# Patient Record
Sex: MALE | Race: BLACK OR AFRICAN AMERICAN | NOT HISPANIC OR LATINO | Employment: STUDENT | URBAN - METROPOLITAN AREA
[De-identification: names, ages, dates, MRNs, and addresses within clinical notes are randomized per-mention and may not be internally consistent; named-entity substitution may affect disease eponyms.]

---

## 2019-06-06 ENCOUNTER — HOSPITAL ENCOUNTER (EMERGENCY)
Facility: HOSPITAL | Age: 19
Discharge: HOME | End: 2019-06-06
Attending: SURGERY
Payer: MEDICAID

## 2019-06-06 VITALS
BODY MASS INDEX: 32.2 KG/M2 | OXYGEN SATURATION: 96 % | TEMPERATURE: 99 F | WEIGHT: 230 LBS | RESPIRATION RATE: 18 BRPM | HEART RATE: 86 BPM | SYSTOLIC BLOOD PRESSURE: 116 MMHG | HEIGHT: 71 IN | DIASTOLIC BLOOD PRESSURE: 72 MMHG

## 2019-06-06 DIAGNOSIS — K29.00 ACUTE SUPERFICIAL GASTRITIS, PRESENCE OF BLEEDING UNSPECIFIED: Primary | ICD-10-CM

## 2019-06-06 PROCEDURE — 99283 EMERGENCY DEPT VISIT LOW MDM: CPT | Mod: ER

## 2019-06-06 PROCEDURE — 25000003 PHARM REV CODE 250: Mod: ER | Performed by: SURGERY

## 2019-06-06 RX ORDER — DICYCLOMINE HYDROCHLORIDE 20 MG/1
20 TABLET ORAL 2 TIMES DAILY
Qty: 20 TABLET | Refills: 0 | Status: SHIPPED | OUTPATIENT
Start: 2019-06-06 | End: 2019-07-06

## 2019-06-06 RX ORDER — OXYCODONE AND ACETAMINOPHEN 5; 325 MG/1; MG/1
1 TABLET ORAL
Status: COMPLETED | OUTPATIENT
Start: 2019-06-06 | End: 2019-06-06

## 2019-06-06 RX ORDER — ONDANSETRON 4 MG/1
8 TABLET, ORALLY DISINTEGRATING ORAL
Status: COMPLETED | OUTPATIENT
Start: 2019-06-06 | End: 2019-06-06

## 2019-06-06 RX ADMIN — ONDANSETRON 8 MG: 4 TABLET, ORALLY DISINTEGRATING ORAL at 02:06

## 2019-06-06 RX ADMIN — OXYCODONE HYDROCHLORIDE AND ACETAMINOPHEN 1 TABLET: 5; 325 TABLET ORAL at 02:06

## 2019-06-06 NOTE — ED PROVIDER NOTES
"Encounter Date: 6/6/2019       History     Chief Complaint   Patient presents with    Abdominal Pain     Pt c/o sharp pain that "comes and then will leave", states pain last approximately 5 minutes before it eases.  States "went to throw up but nothing would come up."  Pt states had two episodes of loose stool.      Intermittent epigastric pain the last day.  The pain is associated with nausea but no vomiting no diarrhea.  The pain lasts a few minutes and goes away he is pain-free now    The history is provided by the patient.   Abdominal Pain   The onset of the illness was gradual. The abdominal pain is located in the epigastric region. The abdominal pain does not radiate. The severity of the abdominal pain is 6/10. The abdominal pain is relieved by nothing. The other symptoms of the illness do not include fever, fatigue, jaundice, melena, nausea, vomiting, diarrhea, dysuria, hematemesis or hematochezia.     Review of patient's allergies indicates:  No Known Allergies  Past Medical History:   Diagnosis Date    Skull fracture 2008     History reviewed. No pertinent surgical history.  Family History   Problem Relation Age of Onset    Migraines Mother      Social History     Tobacco Use    Smoking status: Never Smoker   Substance Use Topics    Alcohol use: No    Drug use: No     Review of Systems   Constitutional: Negative.  Negative for fatigue and fever.   HENT: Negative.    Eyes: Negative.    Respiratory: Negative.    Cardiovascular: Negative.    Gastrointestinal: Positive for abdominal pain. Negative for diarrhea, hematemesis, hematochezia, jaundice, melena, nausea and vomiting.   Endocrine: Negative.    Genitourinary: Negative.  Negative for dysuria.   Musculoskeletal: Negative.    Skin: Negative.    Allergic/Immunologic: Negative.    Neurological: Negative.    Hematological: Negative.    Psychiatric/Behavioral: Negative.        Physical Exam     Initial Vitals [06/06/19 1325]   BP Pulse Resp Temp SpO2 "   127/71 67 18 98.1 °F (36.7 °C) 98 %      MAP       --         Physical Exam    Nursing note and vitals reviewed.  Constitutional: He appears well-developed and well-nourished.   HENT:   Head: Normocephalic.   Mouth/Throat: Oropharynx is clear and moist.   Cardiovascular: Normal rate.   Pulmonary/Chest: Effort normal.   Abdominal: Soft. Normal appearance and bowel sounds are normal. He exhibits no distension. There is no hepatomegaly. There is no tenderness. There is no rigidity, no rebound, no guarding, no CVA tenderness, no tenderness at McBurney's point and negative Young's sign. No hernia.   Neurological: He is alert.   Skin: Skin is warm and dry.   Psychiatric: He has a normal mood and affect.         ED Course   Procedures  Labs Reviewed - No data to display       Imaging Results    None          Medical Decision Making:   Initial Assessment:   Epigastric pains symptoms consistent with gastritis  ED Management:  Physical exam is normal.  Patient has a normal exam  Normal vital signs. He is pain-free at the time of exam                      Clinical Impression:       ICD-10-CM ICD-9-CM   1. Acute superficial gastritis, presence of bleeding unspecified K29.00 535.40         Disposition:   Disposition: Discharged  Condition: Stable                        MICAELA Ansari III, MD  06/06/19 0098

## 2020-04-04 ENCOUNTER — HOSPITAL ENCOUNTER (EMERGENCY)
Facility: HOSPITAL | Age: 20
Discharge: HOME | End: 2020-04-04
Attending: EMERGENCY MEDICINE
Payer: MEDICAID

## 2020-04-04 VITALS
HEIGHT: 73 IN | HEART RATE: 83 BPM | OXYGEN SATURATION: 98 % | TEMPERATURE: 99 F | WEIGHT: 240 LBS | RESPIRATION RATE: 19 BRPM | SYSTOLIC BLOOD PRESSURE: 140 MMHG | BODY MASS INDEX: 31.81 KG/M2 | DIASTOLIC BLOOD PRESSURE: 79 MMHG

## 2020-04-04 DIAGNOSIS — J02.0 STREP THROAT: ICD-10-CM

## 2020-04-04 DIAGNOSIS — U07.1 COVID-19 VIRUS INFECTION: Primary | ICD-10-CM

## 2020-04-04 LAB
GROUP A STREP, MOLECULAR: POSITIVE
SARS-COV-2 RNA AMPLIFICATION, QUAL: POSITIVE

## 2020-04-04 PROCEDURE — 99284 EMERGENCY DEPT VISIT MOD MDM: CPT | Mod: ER

## 2020-04-04 PROCEDURE — U0002 COVID-19 LAB TEST NON-CDC: HCPCS | Mod: ER

## 2020-04-04 PROCEDURE — 87651 STREP A DNA AMP PROBE: CPT | Mod: ER

## 2020-04-04 RX ORDER — AZITHROMYCIN 250 MG/1
250 TABLET, FILM COATED ORAL DAILY
Qty: 6 TABLET | Refills: 0 | Status: SHIPPED | OUTPATIENT
Start: 2020-04-04 | End: 2023-01-07

## 2020-04-04 RX ORDER — ACETAMINOPHEN AND CODEINE PHOSPHATE 300; 30 MG/1; MG/1
1 TABLET ORAL EVERY 6 HOURS PRN
Qty: 7 TABLET | Refills: 0 | Status: SHIPPED | OUTPATIENT
Start: 2020-04-04 | End: 2020-04-14

## 2020-04-04 NOTE — ED PROVIDER NOTES
"Chief Complaint  Chief Complaint   Patient presents with    Generalized Body Aches     "I got body aches and  I have a headache for 3 days now" denies fever       HPI  Paras Vargas is a 19 y.o. male who presents with sore throat and headache and body aches.  Patient reports that he has been sick for about 3 days.  He has not really recognized any fever.  No cough.  No vomiting or diarrhea.  Patient reports his headache is mild-to-moderate in intensity and started gradually and has body aches are moderate intensity.  No exacerbating or relieving factors.  No trauma or neck pain or confusion    Past medical history  Past Medical History:   Diagnosis Date    Skull fracture 2008       Current Medications  No current facility-administered medications for this encounter.     Current Outpatient Medications:     acetaminophen-codeine 300-30mg (TYLENOL #3) 300-30 mg Tab, Take 1 tablet by mouth every 6 (six) hours as needed., Disp: 7 tablet, Rfl: 0    azithromycin (Z-ANISHA) 250 MG tablet, Take 1 tablet (250 mg total) by mouth once daily. Take first 2 tablets together, then 1 every day until finished., Disp: 6 tablet, Rfl: 0    Allergies  Review of patient's allergies indicates:  No Known Allergies    Surgical history  No past surgical history on file.    Social history  Social History     Socioeconomic History    Marital status: Single     Spouse name: Not on file    Number of children: Not on file    Years of education: Not on file    Highest education level: Not on file   Occupational History    Not on file   Social Needs    Financial resource strain: Not on file    Food insecurity:     Worry: Not on file     Inability: Not on file    Transportation needs:     Medical: Not on file     Non-medical: Not on file   Tobacco Use    Smoking status: Never Smoker   Substance and Sexual Activity    Alcohol use: No    Drug use: No    Sexual activity: Not on file   Lifestyle    Physical activity:     Days per week: " "Not on file     Minutes per session: Not on file    Stress: Not on file   Relationships    Social connections:     Talks on phone: Not on file     Gets together: Not on file     Attends Hoahaoism service: Not on file     Active member of club or organization: Not on file     Attends meetings of clubs or organizations: Not on file     Relationship status: Not on file   Other Topics Concern    Not on file   Social History Narrative    Not on file       Family History  Family History   Problem Relation Age of Onset    Migraines Mother        Review of systems  : No dysuria or discharge.  Musculoskeletal: No injury; full range of motion.  Skin: No rash, abscess, or laceration.  Neurologic: No new focal weakness or sensory changes.  All systems otherwise negative except as noted in ROS and HPI    Physical Exam  Vital signs: BP (!) 140/79 (BP Location: Left arm, Patient Position: Sitting)   Pulse 83   Temp 99.2 °F (37.3 °C) (Oral)   Resp 19   Ht 6' 1" (1.854 m)   Wt 108.9 kg (240 lb)   SpO2 98%   BMI 31.66 kg/m²   Constitutional: No acute distress.  Well developed, alert, oriented and appropriate.  HENT: Normocephalic, atraumatic. Normal ear, nose.  Tonsillar hypertrophy and erythema without exudate.  No asymmetry or airway compromise  Eyes: PERRL, EOMI, normal conjunctiva.  Neck: Normal range of motion, no tenderness; supple.  Respiratory: Nonlabored breathing with normal breath sounds.  Cardiovascular: RRR with no pulse deficit.  GI: Soft, nontender, no rebound or guarding.  Musculoskeletal: Normal ROM, no tenderness, injury, or edema.  Skin: Warm, dry skin without infection or injury.  Neurologic: Normal motor, sensation with no new focal deficit.  Psychiatric: Affect normal, judgement normal, mood normal.  No SI, HI, and not gravely disabled.    Labs  Pertinent labs reviewed (see chart for details)  Labs Reviewed   GROUP A STREP, MOLECULAR - Abnormal; Notable for the following components:       Result " Value    Group A Strep, Molecular Positive (*)     All other components within normal limits   SARS-COV-2 RNA AMPLIFICATION, QUAL - Abnormal; Notable for the following components:    SARS-CoV-2 RNA, Amplification, Qual Positive (*)     All other components within normal limits       ECG  No results found for this or any previous visit.  ECG interpreted by ED MD    Radiology  No orders to display       Procedures  Procedures    Medications - No data to display    ED course and medical decision making         Patient comfortable and happy with plan to go home at this time and understands reasons to return emergency department    Disposition    Patient discharged in stable condition      Final impression  1. COVID-19 virus infection    2. Strep throat        Critical care time spent with this patient was 0 minutes excluding the procedure time.          Michael Sánchez MD  04/04/20 1000

## 2021-04-01 ENCOUNTER — HOSPITAL ENCOUNTER (EMERGENCY)
Facility: HOSPITAL | Age: 21
Discharge: HOME | End: 2021-04-01
Attending: EMERGENCY MEDICINE
Payer: MEDICAID

## 2021-04-01 VITALS
HEIGHT: 70 IN | RESPIRATION RATE: 13 BRPM | HEART RATE: 75 BPM | TEMPERATURE: 99 F | OXYGEN SATURATION: 100 % | BODY MASS INDEX: 35.79 KG/M2 | WEIGHT: 250 LBS | SYSTOLIC BLOOD PRESSURE: 147 MMHG | DIASTOLIC BLOOD PRESSURE: 77 MMHG

## 2021-04-01 DIAGNOSIS — M79.673 FOOT PAIN: ICD-10-CM

## 2021-04-01 DIAGNOSIS — S90.31XA CONTUSION OF RIGHT FOOT, INITIAL ENCOUNTER: Primary | ICD-10-CM

## 2021-04-01 PROCEDURE — 25000003 PHARM REV CODE 250: Mod: ER | Performed by: PHYSICIAN ASSISTANT

## 2021-04-01 PROCEDURE — 99283 EMERGENCY DEPT VISIT LOW MDM: CPT | Mod: 25,ER

## 2021-04-01 RX ORDER — NAPROXEN 500 MG/1
500 TABLET ORAL 2 TIMES DAILY WITH MEALS
Qty: 12 TABLET | Refills: 0 | Status: SHIPPED | OUTPATIENT
Start: 2021-04-01

## 2021-04-01 RX ORDER — KETOROLAC TROMETHAMINE 10 MG/1
10 TABLET, FILM COATED ORAL
Status: COMPLETED | OUTPATIENT
Start: 2021-04-01 | End: 2021-04-01

## 2021-04-01 RX ADMIN — KETOROLAC TROMETHAMINE 10 MG: 10 TABLET, FILM COATED ORAL at 01:04

## 2021-12-30 ENCOUNTER — HOSPITAL ENCOUNTER (EMERGENCY)
Facility: HOSPITAL | Age: 21
Discharge: HOME | End: 2021-12-30
Payer: MEDICAID

## 2021-12-30 VITALS
DIASTOLIC BLOOD PRESSURE: 91 MMHG | TEMPERATURE: 98 F | SYSTOLIC BLOOD PRESSURE: 140 MMHG | HEIGHT: 70 IN | HEART RATE: 64 BPM | WEIGHT: 265 LBS | OXYGEN SATURATION: 98 % | RESPIRATION RATE: 20 BRPM | BODY MASS INDEX: 37.94 KG/M2

## 2021-12-30 DIAGNOSIS — H05.011 CELLULITIS OF RIGHT ORBITAL REGION: Primary | ICD-10-CM

## 2021-12-30 PROCEDURE — 99283 EMERGENCY DEPT VISIT LOW MDM: CPT | Mod: ER

## 2021-12-30 RX ORDER — SULFAMETHOXAZOLE AND TRIMETHOPRIM 800; 160 MG/1; MG/1
1 TABLET ORAL 2 TIMES DAILY
Qty: 14 TABLET | Refills: 0 | Status: SHIPPED | OUTPATIENT
Start: 2021-12-30 | End: 2022-01-06

## 2021-12-30 NOTE — DISCHARGE INSTRUCTIONS
Take prescribed antibiotic daily as directed until completely finished.  Follow-up with PCP for continued care and management.

## 2021-12-30 NOTE — ED PROVIDER NOTES
Encounter Date: 12/30/2021       History     Chief Complaint   Patient presents with    Eye Problem     Right upper lid swelling and itching that started yesterday     21-year-old male presenting to ED with complaints of right upper eyelid redness, swelling itching since yesterday morning.  Patient reports slight worsening with no improvement.  Patient denies any contact lenses or glasses use.  Patient denies any recent injury or trauma.  Patient denies any vision changes.  Patient denies any recent debris exposure.  Patient denies any pain.  No alleviating factors noted.  No OTC meds taken.  No other complaints at this time.    The history is provided by the patient. No  was used.     Review of patient's allergies indicates:  No Known Allergies  Past Medical History:   Diagnosis Date    Meningitis     Skull fracture 2008     History reviewed. No pertinent surgical history.  Family History   Problem Relation Age of Onset    Migraines Mother      Social History     Tobacco Use    Smoking status: Never Smoker    Smokeless tobacco: Never Used   Substance Use Topics    Alcohol use: Yes     Comment: seldom    Drug use: Yes     Types: Marijuana     Review of Systems   Constitutional: Negative for chills, diaphoresis, fatigue and fever.   HENT: Negative for congestion, ear pain, sinus pain, sore throat and trouble swallowing.    Eyes: Positive for redness and itching. Negative for photophobia, pain, discharge and visual disturbance.   Respiratory: Negative for cough, choking, chest tightness, shortness of breath, wheezing and stridor.    Cardiovascular: Negative for chest pain and palpitations.   Gastrointestinal: Negative for abdominal pain, diarrhea, nausea and vomiting.   Endocrine: Negative.    Genitourinary: Negative for dysuria, flank pain and hematuria.   Musculoskeletal: Negative for back pain, myalgias and neck pain.   Skin: Negative.    Allergic/Immunologic: Negative.    Neurological:  Negative for dizziness, tremors, seizures, weakness, light-headedness, numbness and headaches.   Hematological: Negative.    Psychiatric/Behavioral: Negative.    All other systems reviewed and are negative.      Physical Exam     Initial Vitals [12/30/21 0912]   BP Pulse Resp Temp SpO2   (!) 140/91 64 20 97.9 °F (36.6 °C) 98 %      MAP       --         Physical Exam    Nursing note and vitals reviewed.  Constitutional: Vital signs are normal. He appears well-developed and well-nourished. He is not diaphoretic. No distress.   21-year-old male sitting upright in no acute distress, nontoxic, alert and oriented, breathing comfortably on room air, steady gait   HENT:   Head: Normocephalic and atraumatic.   Right Ear: Hearing and external ear normal.   Left Ear: Hearing and external ear normal.   Nose: Nose normal. No mucosal edema or rhinorrhea.   Eyes: Conjunctivae and EOM are normal. Pupils are equal, round, and reactive to light. Lids are everted and swept, no foreign bodies found. Right eye exhibits exudate. Right eye exhibits no chemosis, no discharge and no hordeolum. No foreign body present in the right eye. Left eye exhibits no chemosis, no discharge, no exudate and no hordeolum. No foreign body present in the left eye. Right conjunctiva is not injected. Right conjunctiva has no hemorrhage. Left conjunctiva is not injected. Left conjunctiva has no hemorrhage. No scleral icterus.   Right upper eyelid mild edema swelling and erythema with matting of the eyelids, concerning for early soft tissue infection.  No appreciable foreign bodies.  No hemorrhage or injections of the conjunctiva.  Pupils 5 mm equal round reactive bilaterally, extraocular motions fully intact, vision unchanged.  20/25 visual acuity bilaterally.   Neck: Trachea normal. Neck supple.   Normal range of motion.  Cardiovascular: Normal rate, regular rhythm and normal pulses.   Pulmonary/Chest: Effort normal. No accessory muscle usage. No tachypnea.  No respiratory distress.   Abdominal: There is no rigidity and no CVA tenderness.   Musculoskeletal:         General: No tenderness. Normal range of motion.      Cervical back: Normal range of motion and neck supple.     Neurological: He is alert and oriented to person, place, and time. He has normal strength. He displays no tremor. He exhibits normal muscle tone. He displays no seizure activity. Coordination normal. GCS score is 15. GCS eye subscore is 4. GCS verbal subscore is 5. GCS motor subscore is 6.   Skin: Skin is warm and dry. Capillary refill takes less than 2 seconds.         ED Course   Procedures  Labs Reviewed - No data to display       Imaging Results    None          Medications - No data to display                       Clinical Impression:   Final diagnoses:  [H05.011] Cellulitis of right orbital region (Primary)          ED Disposition Condition    Discharge Stable        ED Prescriptions     Medication Sig Dispense Start Date End Date Auth. Provider    sulfamethoxazole-trimethoprim 800-160mg (BACTRIM DS) 800-160 mg Tab Take 1 tablet by mouth 2 (two) times daily. for 7 days 14 tablet 12/30/2021 1/6/2022 Clarice Farias PA-C        Follow-up Information     Follow up With Specialties Details Why Contact [facility] - Emergency Dept Emergency Medicine Go in 2 days If symptoms worsen, For wound re-check [address] [facility] [id number] [phone]    PRIMARY CARE MD  Schedule an appointment as soon as possible for a visit in 3 days If symptoms worsen         PATIENT SEEN BY LO ONLY.    Discussed care plan with patient.  Discussed possible early right orbital cellulitis.  Patient is still able to fully open and close eyelids, vision unchanged, no pain, no appreciable foreign bodies, no injury or trauma.  Patient will be covered with antibiotic, educated on follow-up in the emergency department within the next 48 hours if no improvement or returning sooner if any  worsening.  He expresses full understanding.  Patient is stable, all questions answered and ready for discharge.     Clarice Farias PA-C  12/30/21 0985

## 2023-01-06 ENCOUNTER — HOSPITAL ENCOUNTER (EMERGENCY)
Facility: HOSPITAL | Age: 23
Discharge: HOME | End: 2023-01-06
Attending: EMERGENCY MEDICINE
Payer: MEDICAID

## 2023-01-06 VITALS
RESPIRATION RATE: 17 BRPM | TEMPERATURE: 98 F | WEIGHT: 270 LBS | HEIGHT: 70 IN | DIASTOLIC BLOOD PRESSURE: 69 MMHG | OXYGEN SATURATION: 100 % | SYSTOLIC BLOOD PRESSURE: 137 MMHG | BODY MASS INDEX: 38.65 KG/M2 | HEART RATE: 92 BPM

## 2023-01-06 DIAGNOSIS — R11.2 NAUSEA AND VOMITING, UNSPECIFIED VOMITING TYPE: Primary | ICD-10-CM

## 2023-01-06 DIAGNOSIS — R19.7 DIARRHEA, UNSPECIFIED TYPE: ICD-10-CM

## 2023-01-06 DIAGNOSIS — R93.89 ABNORMAL CT SCAN: ICD-10-CM

## 2023-01-06 LAB
ALBUMIN SERPL BCP-MCNC: 5.5 G/DL (ref 3.5–5.2)
ALP SERPL-CCNC: 113 U/L (ref 38–126)
ALT SERPL W/O P-5'-P-CCNC: 51 U/L (ref 10–44)
ANION GAP SERPL CALC-SCNC: 12 MMOL/L (ref 8–16)
AST SERPL-CCNC: 38 U/L (ref 15–46)
BASOPHILS # BLD AUTO: 0.06 K/UL (ref 0–0.2)
BASOPHILS NFR BLD: 0.4 % (ref 0–1.9)
BILIRUB SERPL-MCNC: 0.8 MG/DL (ref 0.1–1)
CALCIUM SERPL-MCNC: 9.6 MG/DL (ref 8.7–10.5)
CHLORIDE SERPL-SCNC: 108 MMOL/L (ref 95–110)
CO2 SERPL-SCNC: 20 MMOL/L (ref 23–29)
CREAT SERPL-MCNC: 0.77 MG/DL (ref 0.5–1.4)
DIFFERENTIAL METHOD: ABNORMAL
EOSINOPHIL # BLD AUTO: 0.2 K/UL (ref 0–0.5)
EOSINOPHIL NFR BLD: 1.1 % (ref 0–8)
ERYTHROCYTE [DISTWIDTH] IN BLOOD BY AUTOMATED COUNT: 12.6 % (ref 11.5–14.5)
EST. GFR  (NO RACE VARIABLE): >60 ML/MIN/1.73 M^2
GLUCOSE SERPL-MCNC: 114 MG/DL (ref 70–110)
HCT VFR BLD AUTO: 49.8 % (ref 40–54)
HGB BLD-MCNC: 16.7 G/DL (ref 14–18)
IMM GRANULOCYTES # BLD AUTO: 0.04 K/UL (ref 0–0.04)
IMM GRANULOCYTES NFR BLD AUTO: 0.3 % (ref 0–0.5)
INFLUENZA A, MOLECULAR: NEGATIVE
INFLUENZA B, MOLECULAR: NEGATIVE
LIPASE SERPL-CCNC: 66 U/L (ref 23–300)
LYMPHOCYTES # BLD AUTO: 1.4 K/UL (ref 1–4.8)
LYMPHOCYTES NFR BLD: 10.3 % (ref 18–48)
MCH RBC QN AUTO: 27.4 PG (ref 27–31)
MCHC RBC AUTO-ENTMCNC: 33.5 G/DL (ref 32–36)
MCV RBC AUTO: 82 FL (ref 82–98)
MONOCYTES # BLD AUTO: 1.1 K/UL (ref 0.3–1)
MONOCYTES NFR BLD: 8 % (ref 4–15)
NEUTROPHILS # BLD AUTO: 11.1 K/UL (ref 1.8–7.7)
NEUTROPHILS NFR BLD: 79.9 % (ref 38–73)
NRBC BLD-RTO: 0 /100 WBC
PLATELET # BLD AUTO: 321 K/UL (ref 150–450)
PMV BLD AUTO: 9.8 FL (ref 9.2–12.9)
POTASSIUM SERPL-SCNC: 3.9 MMOL/L (ref 3.5–5.1)
PROT SERPL-MCNC: 9.1 G/DL (ref 6–8.4)
RBC # BLD AUTO: 6.1 M/UL (ref 4.6–6.2)
SARS-COV-2 RDRP RESP QL NAA+PROBE: NEGATIVE
SODIUM SERPL-SCNC: 140 MMOL/L (ref 136–145)
SPECIMEN SOURCE: NORMAL
UUN UR-MCNC: 18 MG/DL (ref 2–20)
WBC # BLD AUTO: 13.85 K/UL (ref 3.9–12.7)

## 2023-01-06 PROCEDURE — 25000003 PHARM REV CODE 250: Mod: ER | Performed by: EMERGENCY MEDICINE

## 2023-01-06 PROCEDURE — 96372 THER/PROPH/DIAG INJ SC/IM: CPT | Performed by: EMERGENCY MEDICINE

## 2023-01-06 PROCEDURE — 99285 EMERGENCY DEPT VISIT HI MDM: CPT | Mod: 25,ER

## 2023-01-06 PROCEDURE — 85025 COMPLETE CBC W/AUTO DIFF WBC: CPT | Mod: ER | Performed by: EMERGENCY MEDICINE

## 2023-01-06 PROCEDURE — 25500020 PHARM REV CODE 255: Mod: ER | Performed by: EMERGENCY MEDICINE

## 2023-01-06 PROCEDURE — 63600175 PHARM REV CODE 636 W HCPCS: Mod: ER | Performed by: EMERGENCY MEDICINE

## 2023-01-06 PROCEDURE — 96374 THER/PROPH/DIAG INJ IV PUSH: CPT | Mod: 59,ER

## 2023-01-06 PROCEDURE — U0002 COVID-19 LAB TEST NON-CDC: HCPCS | Mod: ER | Performed by: EMERGENCY MEDICINE

## 2023-01-06 PROCEDURE — 87502 INFLUENZA DNA AMP PROBE: CPT | Mod: ER | Performed by: EMERGENCY MEDICINE

## 2023-01-06 PROCEDURE — 96361 HYDRATE IV INFUSION ADD-ON: CPT | Mod: ER

## 2023-01-06 PROCEDURE — 83690 ASSAY OF LIPASE: CPT | Mod: ER | Performed by: EMERGENCY MEDICINE

## 2023-01-06 PROCEDURE — 80053 COMPREHEN METABOLIC PANEL: CPT | Mod: ER | Performed by: EMERGENCY MEDICINE

## 2023-01-06 RX ORDER — ENOXAPARIN SODIUM 100 MG/ML
1 INJECTION SUBCUTANEOUS
Status: COMPLETED | OUTPATIENT
Start: 2023-01-06 | End: 2023-01-06

## 2023-01-06 RX ORDER — SODIUM CHLORIDE 9 MG/ML
INJECTION, SOLUTION INTRAVENOUS
Status: COMPLETED | OUTPATIENT
Start: 2023-01-06 | End: 2023-01-06

## 2023-01-06 RX ORDER — ONDANSETRON 2 MG/ML
4 INJECTION INTRAMUSCULAR; INTRAVENOUS
Status: COMPLETED | OUTPATIENT
Start: 2023-01-06 | End: 2023-01-06

## 2023-01-06 RX ORDER — DICYCLOMINE HYDROCHLORIDE 10 MG/ML
20 INJECTION INTRAMUSCULAR
Status: COMPLETED | OUTPATIENT
Start: 2023-01-06 | End: 2023-01-06

## 2023-01-06 RX ORDER — ONDANSETRON 4 MG/1
4 TABLET, ORALLY DISINTEGRATING ORAL EVERY 6 HOURS PRN
Qty: 20 TABLET | Refills: 0 | Status: SHIPPED | OUTPATIENT
Start: 2023-01-06

## 2023-01-06 RX ADMIN — SODIUM CHLORIDE: 0.9 INJECTION, SOLUTION INTRAVENOUS at 01:01

## 2023-01-06 RX ADMIN — IOHEXOL 100 ML: 350 INJECTION, SOLUTION INTRAVENOUS at 02:01

## 2023-01-06 RX ADMIN — ONDANSETRON 4 MG: 2 INJECTION INTRAMUSCULAR; INTRAVENOUS at 01:01

## 2023-01-06 RX ADMIN — ENOXAPARIN SODIUM 120 MG: 60 INJECTION SUBCUTANEOUS at 03:01

## 2023-01-06 RX ADMIN — DICYCLOMINE HYDROCHLORIDE 20 MG: 20 INJECTION, SOLUTION INTRAMUSCULAR at 01:01

## 2023-01-06 NOTE — ED PROVIDER NOTES
"Encounter Date: 1/6/2023       History     Chief Complaint   Patient presents with    Abdominal Pain     ABD pain and NVD that started today     22-year-old male presenting with 1 day of diffuse abdominal pain with vomiting and diarrhea.  No fever, cough, congestion, sore throat, dysuria.  Patient says he had diarrhea last night but today he's had "straight water coming out of his ass".  No blood in vomit or stool.  No treatments attempted prior to arrival.    Review of patient's allergies indicates:  No Known Allergies  Past Medical History:   Diagnosis Date    Meningitis     Skull fracture 2008     History reviewed. No pertinent surgical history.  Family History   Problem Relation Age of Onset    Migraines Mother      Social History     Tobacco Use    Smoking status: Never    Smokeless tobacco: Never   Substance Use Topics    Alcohol use: Yes     Comment: seldom    Drug use: Yes     Types: Marijuana     Review of Systems   Constitutional:  Negative for appetite change.   Eyes:  Negative for pain.   Respiratory:  Negative for shortness of breath.    Cardiovascular:  Negative for chest pain.   Gastrointestinal:  Positive for abdominal pain, diarrhea, nausea and vomiting.   Genitourinary:  Negative for frequency.   Musculoskeletal:  Negative for arthralgias and neck pain.   Neurological:  Negative for headaches.   Psychiatric/Behavioral:  Negative for confusion.      Physical Exam     Initial Vitals [01/06/23 1254]   BP Pulse Resp Temp SpO2   (!) 160/95 87 20 98.9 °F (37.2 °C) 97 %      MAP       --         Physical Exam    Nursing note and vitals reviewed.  HENT:   Head: Atraumatic.   Eyes: Conjunctivae and EOM are normal.   Neck:   Normal range of motion.  Cardiovascular:      Exam reveals no gallop and no friction rub.       No murmur heard.  Pulmonary/Chest: Breath sounds normal. No respiratory distress. He has no wheezes. He has no rales.   Abdominal: Abdomen is soft. Bowel sounds are normal. He exhibits no " distension. There is abdominal tenderness (Periumbilical). There is no rebound.   Musculoskeletal:         General: No edema. Normal range of motion.      Cervical back: Normal range of motion.     Neurological: He is alert and oriented to person, place, and time.   Skin: Skin is warm and dry.   Psychiatric: He has a normal mood and affect.       ED Course   Procedures  Labs Reviewed   COMPREHENSIVE METABOLIC PANEL - Abnormal; Notable for the following components:       Result Value    CO2 20 (*)     Glucose 114 (*)     Total Protein 9.1 (*)     Albumin 5.5 (*)     ALT 51 (*)     All other components within normal limits   CBC W/ AUTO DIFFERENTIAL - Abnormal; Notable for the following components:    WBC 13.85 (*)     Gran # (ANC) 11.1 (*)     Mono # 1.1 (*)     Gran % 79.9 (*)     Lymph % 10.3 (*)     All other components within normal limits   INFLUENZA A & B BY MOLECULAR   LIPASE   SARS-COV-2 RNA AMPLIFICATION, QUAL    Narrative:     Is the patient symptomatic?->Yes          Imaging Results              CT Abdomen Pelvis With Contrast (Final result)  Result time 01/06/23 14:34:50      Final result by DEBBIE Duran Sr., MD (01/06/23 14:34:50)                   Impression:      1. There are findings worrisome for a pulmonary embolus to the lingula.  2. Lymphadenopathy.  There are mildly enlarged lymph nodes in the groins.  One of the larger ones is located in the left groin. It has a short axis measurement of 13 mm.  3. The above findings and impressions were discussed with Dr. Madrid via the telephone at 14:33 on 01/06/2023.  I observed these findings at 14:30 on 01/06/2023.  All CT scans at this facility use dose modulation, iterative reconstruction, and/or weight base dosing when appropriate to reduce radiation dose when appropriate to reduce radiation dose to as low as reasonably achievable.      Electronically signed by: Higinio Duran MD  Date:    01/06/2023  Time:    14:34               Narrative:     EXAMINATION:  CT ABDOMEN PELVIS WITH CONTRAST    CLINICAL HISTORY:  Abdominal pain, acute, nonlocalized;    TECHNIQUE:  Standard abdomen and pelvis CT protocol with IV contrast was performed    COMPARISON:  None    FINDINGS:  Finding: The size of the heart is within normal limits.  There are findings worrisome for a pulmonary embolus to the lingula.  There are minimal dependent atelectatic changes in both lungs.  There is no pneumothorax or pleural effusion.    The liver, gallbladder, pancreas, spleen, adrenals, and kidneys are normal in appearance. The ureters and the urinary bladder are normal in appearance.  The prostate is normal in appearance.  The appendix and the rest of the gastrointestinal system are normal in appearance. There is no free fluid within the abdomen or pelvis. There is no pneumoperitoneum.  There are mildly enlarged lymph nodes in the groins.  One of the larger ones is located in the left groin.  It has a short axis measurement of 13 mm.                                       Medications   enoxaparin injection 120 mg (has no administration in time range)   dicyclomine injection 20 mg (20 mg Intramuscular Given 1/6/23 1324)   ondansetron injection 4 mg (4 mg Intravenous Given 1/6/23 1322)   0.9%  NaCl infusion ( Intravenous New Bag 1/6/23 1323)   iohexoL (OMNIPAQUE 350) injection 100 mL (100 mLs Intravenous Given 1/6/23 1402)     Medical Decision Making:   Initial Assessment:   22-year-old male presenting with vomiting and diarrhea with diffuse abdominal pain since this morning.  Appears mildly uncomfortable.  Vital signs notable for mild hypertension.  Has some periumbilical tenderness without evidence of peritonitis.  IV fluids, Zofran and Bentyl given.    2:53 PM  Labs show mild leukocytosis.  No significant metabolic abnormalities.  No evidence of pancreatitis. Dr. Duran of Radiology called regarding a CT of the abdomen and pelvis.  No significant intra-abdominal findings however he noted  what appears to be a pulmonary embolism in the left lingula.  The patient has no chest pain or shortness of breath.  Discussed case with Dr. Reeves of Hematology on-call.  Will give the patient a dose of Lovenox 1 mg/kg now.  Discussed findings with the patient.  He will return tomorrow morning to obtain a CTA of the chest to further evaluate for PE.  Explained to patient the importance of returning tomorrow as the alternative would be to initiate anticoagulation long-term.  He says he will definitely come tomorrow morning for CTA.                        Clinical Impression:   Final diagnoses:  [R11.2] Nausea and vomiting, unspecified vomiting type (Primary)  [R19.7] Diarrhea, unspecified type  [R93.89] Abnormal CT scan        ED Disposition Condition    Discharge Stable          ED Prescriptions       Medication Sig Dispense Start Date End Date Auth. Provider    ondansetron (ZOFRAN-ODT) 4 MG TbDL Take 1 tablet (4 mg total) by mouth every 6 (six) hours as needed. 20 tablet 1/6/2023 -- Jose Madrid MD          Follow-up Information       Follow up With Specialties Details Why Contact [facility] - Emergency Dept Emergency Medicine Go to  Tomorrow for CTA of chest to rule out blood clot [address] [facility] [id number] [phone]             Jose Madrid MD  01/06/23 3409

## 2023-01-06 NOTE — ED NOTES
Patient identifiers verified. Presents to ED with complaints of abdominal pain, nausea and vomiting that began this morning. Denies any other complaints including , fever, chills. ER workup in progress.     APPEARANCE: Pt clean and well groomed with clothes appropriately fastened. No distress is noted.  NEURO: Pt is awake and alert x3, Pt follows commands and affect is appropriate. Pt is moving all extremities well and sensation is intact. Speech is clear.  RESPIRATORY: Respirations are even and unlabored on room air. No accessory muscle use noted. Normal rate and effort is noted. Pt placed on continuous pulse ox with O2 sats noted at 98%.   CARDIAC:  Rate is normal. No abnormal heart sounds noted. Radial and dorsalis pedis pulses are palpable and +2. No edema noted. Cap refill is <3.   GASTRO: Pt denies abdominal pain/tenderness. States bowel movements have been regular. Bowel sounds are present and normal.   : Pt denies any pain or frequency with urination.  SKIN: Skin is warm, dry and intact. Skin color is appropriate for ethnicity. Normal skin turgor noted. Mucous membranes are moist.   MUSCULOSKELETAL: No abnormalities are noted.

## 2023-01-06 NOTE — DISCHARGE INSTRUCTIONS
The CT scan of your abdomen today showed a possible blood clot of 1 of the arteries of your lung.  This was not the ideal study to diagnosed this.  Please return tomorrow to get a CTA of your chest to further evaluate for pulmonary embolism   No

## 2023-01-07 ENCOUNTER — HOSPITAL ENCOUNTER (EMERGENCY)
Facility: HOSPITAL | Age: 23
Discharge: HOME | End: 2023-01-07
Attending: EMERGENCY MEDICINE
Payer: MEDICAID

## 2023-01-07 VITALS
HEIGHT: 70 IN | WEIGHT: 270 LBS | RESPIRATION RATE: 17 BRPM | SYSTOLIC BLOOD PRESSURE: 139 MMHG | DIASTOLIC BLOOD PRESSURE: 80 MMHG | HEART RATE: 84 BPM | OXYGEN SATURATION: 98 % | TEMPERATURE: 99 F | BODY MASS INDEX: 38.65 KG/M2

## 2023-01-07 DIAGNOSIS — R03.0 ELEVATED BP WITHOUT DIAGNOSIS OF HYPERTENSION: ICD-10-CM

## 2023-01-07 DIAGNOSIS — K76.0 FATTY LIVER: ICD-10-CM

## 2023-01-07 DIAGNOSIS — R19.7 DIARRHEA, UNSPECIFIED TYPE: Primary | ICD-10-CM

## 2023-01-07 LAB
ANION GAP SERPL CALC-SCNC: 8 MMOL/L (ref 8–16)
CALCIUM SERPL-MCNC: 8.6 MG/DL (ref 8.7–10.5)
CHLORIDE SERPL-SCNC: 104 MMOL/L (ref 95–110)
CO2 SERPL-SCNC: 24 MMOL/L (ref 23–29)
CREAT SERPL-MCNC: 0.85 MG/DL (ref 0.5–1.4)
EST. GFR  (NO RACE VARIABLE): >60 ML/MIN/1.73 M^2
GLUCOSE SERPL-MCNC: 110 MG/DL (ref 70–110)
POTASSIUM SERPL-SCNC: 3.3 MMOL/L (ref 3.5–5.1)
SODIUM SERPL-SCNC: 136 MMOL/L (ref 136–145)
UUN UR-MCNC: 17 MG/DL (ref 2–20)

## 2023-01-07 PROCEDURE — 80048 BASIC METABOLIC PNL TOTAL CA: CPT | Mod: ER | Performed by: EMERGENCY MEDICINE

## 2023-01-07 PROCEDURE — 25500020 PHARM REV CODE 255: Mod: ER | Performed by: EMERGENCY MEDICINE

## 2023-01-07 PROCEDURE — 99285 EMERGENCY DEPT VISIT HI MDM: CPT | Mod: 25,ER

## 2023-01-07 RX ADMIN — IOHEXOL 100 ML: 350 INJECTION, SOLUTION INTRAVENOUS at 09:01

## 2023-01-07 NOTE — Clinical Note
"Paras Eid" Alicia was seen and treated in our emergency department on 1/7/2023.  He may return to work on 01/09/2023.       If you have any questions or concerns, please don't hesitate to call.      STU Geller RN    "

## 2023-01-07 NOTE — ED NOTES
Patient presents to Ed for CTA; states he was seen yesterday had a ct with contrast done and there was a finding of a possible blood clot in his lung. He was instructed to return today for another scan of his chest with contrast. He denies SOB,CP or n/v. He does state that he is still having loose stool.

## 2023-01-07 NOTE — ED PROVIDER NOTES
Chief Complaint: told to come back for a CT scan     History of Present Illness:    Paras Vargas 22 y.o. with a  has a past medical history of Meningitis and Skull fracture (2008). who presents to the emergency department today with a complaint of being told last night that he needed to come back today and get a CT scan for a blood clot.  Pt was here yesterday for abdominal pain with NVD.  He had a CT scan of the abd/pelvis performed that showed a possible PE.  He had contrast already last night so he was told to return for imaging in order to confirm before started anticoagulants.  He is here today for this study.  He is still having some diarrhea, watery.  Denies any chest pain, shortness of breath, leg swelling     ROS    Constitutional: No fever, no chills.  Eyes: No discharge. No pain.  ENT: No nasal drainage. No ear ache. No sore throat.  Cardiovascular: No chest pain, no palpitations.  Respiratory: No cough, no shortness of breath.  Gastrointestinal: No abdominal pain.  Genitourinary: No hematuria, dysuria, urgency.  Musculoskeletal: No back pain.   Skin: No rashes, no lesions.  Neurological: No headache, no focal weakness.      Otherwise remaining ROS negative     The history is provided by the patient      Reviewed and verified by myself:   PMH/PSH/SOC/FH REVIEWED :  Paras Vargas  has a past medical history of Meningitis and Skull fracture (2008). and   has no past surgical history on file. with  reports that he has never smoked. He has never used smokeless tobacco. He reports current alcohol use. He reports current drug use. Drug: Marijuana. and a family history includes Migraines in his mother.      Nursing/Ancillary staff note reviewed.  ALLERGIES REVIEWED  MEDICATIONS REVIEWED  VS reviewed         Physical Exam     ED Triage Vitals [01/07/23 0749]   /82   Pulse 92   Resp 19   Temp 99.1 °F (37.3 °C)   SpO2 96 %       Physical Exam  Vitals and nursing note reviewed.   Constitutional:        General: He is not in acute distress.     Appearance: He is well-developed.   HENT:      Head: Normocephalic and atraumatic.      Right Ear: External ear normal.      Left Ear: External ear normal.      Nose: Nose normal.      Mouth/Throat:      Mouth: Mucous membranes are moist.   Eyes:      General:         Right eye: No discharge.         Left eye: No discharge.      Conjunctiva/sclera: Conjunctivae normal.   Cardiovascular:      Rate and Rhythm: Normal rate.   Pulmonary:      Effort: Pulmonary effort is normal. No respiratory distress.   Abdominal:      General: There is no distension.   Musculoskeletal:         General: Normal range of motion.      Cervical back: Normal range of motion.   Skin:     General: Skin is warm and dry.      Coloration: Skin is not jaundiced.   Neurological:      General: No focal deficit present.      Mental Status: He is alert and oriented to person, place, and time.      Cranial Nerves: No cranial nerve deficit.      Motor: No abnormal muscle tone.   Psychiatric:         Behavior: Behavior normal.             I independently interpreted the lab results and it showed the following:      Labs Reviewed   BASIC METABOLIC PANEL - Abnormal; Notable for the following components:       Result Value    Potassium 3.3 (*)     Calcium 8.6 (*)     All other components within normal limits         Reviewed by myself, read by radiology.     CTA Chest Non-Coronary (PE Studies)   Final Result      1. Negative CTA chest.   All CT scans at this facility are performed  using dose modulation techniques as appropriate to performed exam including the following:  automated exposure control; adjustment of mA and/or kV according to the patients size (this includes techniques or standardized protocols for targeted exams where dose is matched to indication/reason for exam: i.e. extremities or head);  iterative reconstruction technique.         Electronically signed by: Higinio Shea   Date:    01/07/2023    Time:    09:13                ED Course     Medications   iohexoL (OMNIPAQUE 350) injection 100 mL (100 mLs Intravenous Given 1/7/23 0901)                     Medical Decision Making:     Medical Decision Making  This is a 22-year-old male who presents to the emergency department today for a CTA of the chest.  Patient had a possible PE seen on a CT of the abdomen and pelvis yesterday.  Given the fact that he had already had contrast decision was made to have him return today in order to get the CTA study.  Should himself has no symptoms of PE.  He is still having some diarrhea.  This is similar to his presentation yesterday with a normal workup for that complaint.  No need for further evaluation of his diarrhea.    Problems Addressed:  Diarrhea, unspecified type: acute illness or injury  Elevated BP without diagnosis of hypertension: undiagnosed new problem with uncertain prognosis  Fatty liver: undiagnosed new problem with uncertain prognosis    Amount and/or Complexity of Data Reviewed  External Data Reviewed: labs and notes.     Details: I reviewed the ED visit from yesterday:  See HPI  Labs: ordered.     Details: BMP unremarkable, renal function appropriate after receiving contrast yesterday.  Radiology: ordered.     Details: Reviewed by myself, read by Radiology    Risk  OTC drugs.  Risk Details: This is a 22-year-old male presents to the emergency department for possible pulmonary embolism.  His CTA today is negative for PE.  He did show fatty liver.  I discussed with him the nature of this, discussed diet modifications, discussed long-term consequences of uncontrolled fatty liver.  Patient understands.  He is very reassuring that he does not have a blood clot in the lungs.  He is ready to go home.  After taking into careful account the historical factors and physical exam findings of the patient's presentation today, in conjunction with the empirical and objective data obtained on ED workup, no acute  emergent medical condition requiring admission has been identified. The patient appears to be low risk for an emergent medical condition and I feel it is safe and appropriate at this time for the patient to be discharged to follow-up as detailed in their discharge instructions for reevaluation and possible continued outpatient workup and management. I have discussed the specifics of the workup with the patient and the patient has verbalized understanding of the details of the workup, the diagnosis, the treatment plan, and the need for outpatient follow-up.  Although the patient has no emergent etiology today this does not preclude the development of an emergent condition so in addition, I have advised the patient that they can return to the ED and/or activate EMS at any time with worsening of their symptoms, change of their symptoms, or with any other medical complaint.  The patient remained comfortable and stable during their visit in the ED.  Discharge and follow-up instructions discussed with the patient who expressed understanding and willingness to comply with my recommendations.     This medical record was prepared using voice dictation software. There may be phonetic errors.              Voice recognition software utilized in this note.    The pt had an elevated blood pressure here in the emergency department.  While this could be causing many different things such as pain and even just being in the emergency department the patient understands the need to follow-up with his PCP for further management.  They will need to follow up with their PCP for further evaluation and management in 2-3 days.             Impression      The primary encounter diagnosis was Diarrhea, unspecified type. Diagnoses of Elevated BP without diagnosis of hypertension and Fatty liver were also pertinent to this visit.                Discharge Medication List as of 1/7/2023  9:30 AM           Follow-up Information       Schedule an  appointment as soon as possible for a visit  with Your PCP.                                        Bruno Olea MD  01/07/23 0938

## 2025-03-13 ENCOUNTER — OFFICE VISIT (OUTPATIENT)
Dept: FAMILY MEDICINE | Facility: HOSPITAL | Age: 25
End: 2025-03-13
Payer: MEDICAID

## 2025-03-13 VITALS
TEMPERATURE: 98 F | HEIGHT: 70 IN | WEIGHT: 277.56 LBS | OXYGEN SATURATION: 98 % | SYSTOLIC BLOOD PRESSURE: 125 MMHG | DIASTOLIC BLOOD PRESSURE: 76 MMHG | BODY MASS INDEX: 39.74 KG/M2 | RESPIRATION RATE: 18 BRPM | HEART RATE: 70 BPM

## 2025-03-13 DIAGNOSIS — L70.0 COMEDONAL ACNE: ICD-10-CM

## 2025-03-13 DIAGNOSIS — Z30.2 ENCOUNTER FOR VASECTOMY: ICD-10-CM

## 2025-03-13 DIAGNOSIS — Z76.89 ENCOUNTER TO ESTABLISH CARE: ICD-10-CM

## 2025-03-13 DIAGNOSIS — Z11.3 ROUTINE SCREENING FOR STI (SEXUALLY TRANSMITTED INFECTION): ICD-10-CM

## 2025-03-13 DIAGNOSIS — L83 ACANTHOSIS NIGRICANS: Primary | ICD-10-CM

## 2025-03-13 PROCEDURE — 99215 OFFICE O/P EST HI 40 MIN: CPT

## 2025-03-13 RX ORDER — TRETINOIN 0.25 MG/G
CREAM TOPICAL NIGHTLY
Qty: 45 G | Refills: 0 | Status: SHIPPED | OUTPATIENT
Start: 2025-03-13 | End: 2025-03-14 | Stop reason: SDUPTHER

## 2025-03-13 NOTE — PROGRESS NOTES
[facility] FAMILY PRACTICE CLINIC NOTE  New Patient Visit      SUBJECTIVE:     Patient: Paras Vargas is a 24 y.o. male.    Chief Compliant:   Chief Complaint   Patient presents with    Annual Exam       History of Present Illness:  24 year old male no pertinent PMHx presenting today to establish care and concerns of skin compalints    #Darkening Skin  States he was out drinking with significant other in the past and she noticed darkening of his anterior neck skin. States afterwards he has washed his skin and it has improved mildly although it is still present. Denies any catabolic symptoms od diabetes such as polydipsia, polyuria, or weight loss.     #Obesity  States he has been meaning to diet to lose weight, however, has not had the time. Denies fatigue, decreased energy.     #Birth Control  States he would like vasectomy. Has children and does not desire further children. Discussed with wife who has tried forms of birth control including OCP's although she has had her fair share of side effects.     #HCM  Amenable to STI screening.  Not amenable to vaccinations this visit.     Review of patient's allergies indicates:  No Known Allergies    Past Medical History:   Diagnosis Date    Meningitis     Skull fracture 2008       Current Outpatient Medications   Medication Instructions    naproxen (NAPROSYN) 500 mg, Oral, 2 times daily with meals    ondansetron (ZOFRAN-ODT) 4 mg, Oral, Every 6 hours PRN       No past surgical history on file.    Family History   Problem Relation Name Age of Onset    Migraines Mother         Social History[1]       ROS  A 10+ review of systems was performed with pertinent positives and negatives noted above in the history of present illness. Other systems were negative unless otherwise specified.    OBJECTIVE:     Vital Signs (Most Recent)  Vitals:    03/13/25 0807   BP: 125/76   Pulse: 70   Resp: 18   Temp: 98.1 °F (36.7 °C)   TempSrc: Oral   SpO2: 98%   Weight: 125.9 kg (277 lb 9 oz)  "  Height: 5' 10" (1.778 m)     BMI: Body mass index is 39.83 kg/m².     Physical Exam:  Physical Exam  Constitutional:       Appearance: Normal appearance. He is obese.   HENT:      Head: Normocephalic and atraumatic.      Comments: Close comedonal areas around face diffusely, seen greater on the R cheek than L     Mouth/Throat:      Mouth: Mucous membranes are moist.      Pharynx: Oropharynx is clear.   Eyes:      Extraocular Movements: Extraocular movements intact.      Conjunctiva/sclera: Conjunctivae normal.      Pupils: Pupils are equal, round, and reactive to light.   Cardiovascular:      Rate and Rhythm: Normal rate and regular rhythm.      Pulses: Normal pulses.      Heart sounds: Normal heart sounds.   Pulmonary:      Effort: Pulmonary effort is normal.      Breath sounds: Normal breath sounds.   Abdominal:      General: Abdomen is flat.      Palpations: Abdomen is soft.   Musculoskeletal:         General: Normal range of motion.      Cervical back: Normal range of motion and neck supple.   Skin:     General: Skin is warm and dry.      Capillary Refill: Capillary refill takes less than 2 seconds.      Comments: Darkening, velvety areas on posterior and anterior nape   Neurological:      General: No focal deficit present.      Mental Status: He is alert and oriented to person, place, and time.   Psychiatric:         Mood and Affect: Mood normal.         Behavior: Behavior normal.         Thought Content: Thought content normal.            ASSESSMENT:   Paars Vargas is a 24 y.o. male who presents to clinic to establish care    1. Acanthosis nigricans    2. BMI 39.0-39.9,adult    3. Routine screening for STI (sexually transmitted infection)    4. Encounter for vasectomy    5. Encounter to establish care       PLAN:     Acanthosis nigricans  Encounter to establish care  BMI 39.0-39.9,adult  -     Lipid Panel; Future; Expected date: 03/13/2025  -     Comprehensive Metabolic Panel; Future; Expected date: " 03/13/2025  -     CBC Auto Differential; Future; Expected date: 03/13/2025  -     Hemoglobin A1C; Future; Expected date: 03/13/2025  -     TSH; Future; Expected date: 03/13/2025  - Given clinical findings, patient with dark velvety skin on nape likely suggestive of acanothis nigricans which can be seen in T2DM.  - No catabolic symptoms od diabetes, however, will obtain A1c and baseline labs as above.  - Additionally counseled patient on lifestyle modifications for weight loss including tracking caloric intake.  - Counseled patient on the importance maintaining a healthy diet (including at least one-half of food eaten should be whole fruits and vegetables with the core elements of the other half of food  should include grains, whole grains, dairy, protein, and oils with lower saturated fat, as well as minimizing alcohol use and consumption of foods with added sugar, saturated fat, and sodium.)    - Counseled patient on importance of exercising at least 150-300 minutes per week (i.e. at least 30 minutes, 3 days a week) of moderate physical activity.      Routine screening for STI (sexually transmitted infection)  -     Hepatitis C Antibody; Future; Expected date: 03/13/2025  -     HIV 1/2 Ag/Ab (4th Gen); Future; Expected date: 03/13/2025    Encounter for vasectomy  -     Ambulatory referral/consult to Urology; Future; Expected date: 03/20/2025    Comedonal acne  -     Ambulatory referral/consult to Dermatology; Future; Expected date: 03/20/2025  -     tretinoin (RETIN-A) 0.025 % cream; Apply topically every evening.  Dispense: 45 g; Refill: 0  - Given findings as above, patient likely with non inflammatory comedonal acne.  - Has tried benzoyl peroxide without relief.  - Will trial patient on tretinoin.  - Instructed take every other day as dry skin is a likely side effect.  - Also instructed to moisturize as well as apply sunscreen as photosensitivity can be an adverse reaction with tretinoin.  - Derm referral as  above.     Provided patient with anticipatory guidance and return precautions. Treatment plan discussed with patient, all questions answered, and patient acknowledged understanding and verbal agreement.      Follow-up in: 4 weeks; or sooner PRN if acute concerns arise.      Case discussed with Dr. SURI Vera Do, MD  [facility] Family Medicine PGY-2         [1]   Social History  Tobacco Use    Smoking status: Never    Smokeless tobacco: Never   Substance Use Topics    Alcohol use: Yes     Comment: seldom    Drug use: Yes     Types: Marijuana

## 2025-03-14 DIAGNOSIS — L70.0 COMEDONAL ACNE: ICD-10-CM

## 2025-03-17 ENCOUNTER — OFFICE VISIT (OUTPATIENT)
Dept: UROLOGY | Facility: CLINIC | Age: 25
End: 2025-03-17
Payer: MEDICAID

## 2025-03-17 VITALS — WEIGHT: 273.56 LBS | BODY MASS INDEX: 39.26 KG/M2

## 2025-03-17 DIAGNOSIS — Z30.2 ENCOUNTER FOR VASECTOMY: Primary | ICD-10-CM

## 2025-03-17 PROCEDURE — 99203 OFFICE O/P NEW LOW 30 MIN: CPT | Mod: S$PBB,,, | Performed by: UROLOGY

## 2025-03-17 PROCEDURE — 3008F BODY MASS INDEX DOCD: CPT | Mod: CPTII,,, | Performed by: UROLOGY

## 2025-03-17 PROCEDURE — 1160F RVW MEDS BY RX/DR IN RCRD: CPT | Mod: CPTII,,, | Performed by: UROLOGY

## 2025-03-17 PROCEDURE — 1159F MED LIST DOCD IN RCRD: CPT | Mod: CPTII,,, | Performed by: UROLOGY

## 2025-03-17 PROCEDURE — 3044F HG A1C LEVEL LT 7.0%: CPT | Mod: CPTII,,, | Performed by: UROLOGY

## 2025-03-17 PROCEDURE — 99212 OFFICE O/P EST SF 10 MIN: CPT | Mod: PBBFAC | Performed by: UROLOGY

## 2025-03-17 PROCEDURE — 99999 PR PBB SHADOW E&M-EST. PATIENT-LVL II: CPT | Mod: PBBFAC,,, | Performed by: UROLOGY

## 2025-03-17 RX ORDER — DIAZEPAM 10 MG/1
10 TABLET ORAL
Qty: 1 TABLET | Refills: 0 | Status: SHIPPED | OUTPATIENT
Start: 2025-03-17 | End: 2025-04-16

## 2025-03-17 NOTE — PROGRESS NOTES
Subjective:       Paras Vargas is a 24 y.o. male who is a new patient who was referred by Dr. Chuy Hardy  for evaluation of vasectomy.      He is interested in vasectomy. Currently with 3 children. No prior  history/surgery. Here with his wife today.     Patient here for pre-vasectomy consultation. He denies hematuria, urinary tract infections, urolithiasis, prostatitis, erectile dysfunction, previous genitourinary surgery, epididymal orchitis, testicular masses, scrotal trauma, sexually transmitted diseases. He was given the consent form and pre-vasectomy instruction sheet. I extensively reviewed with him the likely postoperative recuperative period as well as the need to continue to use contraception until he is notified by us of his sterility. He will have a semen analysis after 12 weeks. He understands the potential side effects of anesthesia, bleeding, scrotal hematoma, wound infection, epididymal orchitis, epididymal congestion, chronic testicular pain requiring further surgery, sperm granuloma, antisperm antibodies, early recanalization, spontaneous recanalization with pregnancy after demonstration of azoospermia and the possible association with prostate cancer. He is aware of alternatives to vasectomy.     AUA guidelines for vasectomy  -Vasectomy is intended to be a permanent form of contraception.   -Vasectomy does not produce immediate sterility.   -Following vasectomy, another form of contraception is required until vas occlusion is confirmed by -post- vasectomy semen analysis (PVSA).   -Even after vas occlusion is confirmed, vasectomy is not 100% reliable in preventing pregnancy. The risk of pregnancy after vasectomy is approximately 1 in 2,000 for men who have post-vasectomy azoospermia or PVSA showing rare non-motile sperm (RNMS).   -Repeat vasectomy is necessary in <=1% of vasectomies, provided that a technique for vas occlusion known to have a low occlusive failure rate has been used.    -Patients should refrain from ejaculation for approximately one week after vasectomy.  -Options for fertility after vasectomy include vasectomy reversal and sperm retrieval with in vitro fertilization. These options are not always successful, and they may be expensive.   -The rates of surgical complications such as symptomatic hematoma and infection are 1-2%. These rates vary with the surgeon's experience and the criteria used to diagnose these conditions.  -Chronic scrotal pain associated with negative impact on quality of life occurs after vasectomy in about 1-2% of men. Few of these men require additional surgery.   -Other permanent and non-permanent alternatives to vasectomy are available.      The following portions of the patient's history were reviewed and updated as appropriate: allergies, current medications, past family history, past medical history, past social history, past surgical history and problem list.    Review of Systems  Twelve point review of systems completed. Pertinent positive and negatives listed in HPI.      Objective:    Vitals: Wt 124.1 kg (273 lb 9.5 oz)   BMI 39.26 kg/m²     Physical Exam   General: well developed, well nourished in no acute distress  Head: normocephalic, atraumatic  Neck: no obvious enlargement of thyroid  HEENT: EOMI, mucus membranes moist, sclera anicteric, no hearing impairment  Lungs: symmetric expansion, non-labored breathing  Neuro: alert and oriented x 3, no gross deficits  Psych: normal judgment and insight, normal mood/affect and non-anxious  Genitourinary:   Scrotum  - no lesions or rashes, no hydrocele or hernia.  Testes - descended bilaterally, symmetric without masses, non tender.  Epididymides - 1cm cyst in L superior epididymis, nontender. B/l vas easily palpable.   LAUREN: deferred      Lab Review   Urine analysis today in clinic shows +trace protein     Lab Results   Component Value Date    WBC 5.47 03/13/2025    HGB 15.2 03/13/2025    HCT 46.4  "03/13/2025    MCV 82 03/13/2025     03/13/2025     Lab Results   Component Value Date    CREATININE 0.8 03/13/2025    BUN 19 03/13/2025    BUN 10 12/19/2014     No results found for: "PSA"  No results found for: "PSADIAG"    Imaging  NA       Assessment/Plan:      1. Encounter for vasectomy    - He understands that vasectomy is a permanent solution for sterility. He also understands that he will need alternative forms of birth control after vasectomy until negative semen analysis completely at 3 months post-operatively. Risks, benefits, and alternative discussed thoroughly with patient today. He understands the risks of bleeding, infection, post-vasectomy pain syndrome, re-cannulization of vas, and need for further procedure. He is aware that vasectomy reversal is not guaranteed to be successful. He wishes to proceed with vasectomy.   - Valium sent to pharmacy - PRN on call to procedure.   - Written informed consent obtained today. Medicaid consent also signed.          Follow up for vasectomy         "

## 2025-03-20 RX ORDER — TRETINOIN 0.25 MG/G
CREAM TOPICAL NIGHTLY
Qty: 45 G | Refills: 0 | Status: SHIPPED | OUTPATIENT
Start: 2025-03-20

## 2025-03-31 DIAGNOSIS — L70.0 COMEDONAL ACNE: ICD-10-CM

## 2025-04-03 RX ORDER — TRETINOIN 0.25 MG/G
CREAM TOPICAL NIGHTLY
Qty: 45 G | Refills: 0 | Status: SHIPPED | OUTPATIENT
Start: 2025-04-03

## 2025-04-14 ENCOUNTER — OFFICE VISIT (OUTPATIENT)
Dept: FAMILY MEDICINE | Facility: HOSPITAL | Age: 25
End: 2025-04-14
Payer: MEDICAID

## 2025-04-14 VITALS
BODY MASS INDEX: 37.97 KG/M2 | WEIGHT: 265.19 LBS | HEIGHT: 70 IN | HEART RATE: 62 BPM | SYSTOLIC BLOOD PRESSURE: 122 MMHG | OXYGEN SATURATION: 100 % | DIASTOLIC BLOOD PRESSURE: 87 MMHG

## 2025-04-14 DIAGNOSIS — L70.0 COMEDONAL ACNE: ICD-10-CM

## 2025-04-14 DIAGNOSIS — Z30.2 ENCOUNTER FOR VASECTOMY: ICD-10-CM

## 2025-04-14 DIAGNOSIS — R73.03 PREDIABETES: Primary | ICD-10-CM

## 2025-04-14 PROCEDURE — 99213 OFFICE O/P EST LOW 20 MIN: CPT

## 2025-04-14 RX ORDER — TRETINOIN 0.25 MG/G
CREAM TOPICAL NIGHTLY
Qty: 45 G | Refills: 0 | Status: SHIPPED | OUTPATIENT
Start: 2025-04-14

## 2025-04-14 NOTE — PROGRESS NOTES
"  [facility] FAMILY PRACTICE CLINIC NOTE  Follow-up Visit      SUBJECTIVE:     Patient: Paras Vargas is a 24 y.o. male.    Chief Compliant:   Chief Complaint   Patient presents with    Follow-up     History of Present Illness:  24 year old male PMHx of acne and recent vasectomy presents today from clinic for follow up.    #Acne  Given prescription of tretinoin prior visit for exam findings and complaints significant for acne. Was not able to  medication due to pharmacy having to fill prior authorization.     #Vasectomy  Preliminary plans for vasectomy May 5th. No acute issues or complaints.     ROS  A 10+ review of systems was performed with pertinent positives and negatives noted above in the history of present illness. Other systems were negative unless otherwise specified.    OBJECTIVE:     Vital Signs (Most Recent)  Vitals:    04/14/25 0857   BP: 122/87   Patient Position: Sitting   Pulse: 62   SpO2: 100%   Weight: 120.3 kg (265 lb 3.4 oz)   Height: 5' 10" (1.778 m)     BMI: Body mass index is 38.05 kg/m².     Physical Exam:  Physical Exam  Constitutional:       Appearance: Normal appearance. He is obese.   HENT:      Head: Normocephalic and atraumatic.      Comments: Close comedonal areas around face diffusely, seen greater on the R cheek than L     Mouth/Throat:      Mouth: Mucous membranes are moist.      Pharynx: Oropharynx is clear.   Eyes:      Extraocular Movements: Extraocular movements intact.      Conjunctiva/sclera: Conjunctivae normal.      Pupils: Pupils are equal, round, and reactive to light.   Cardiovascular:      Rate and Rhythm: Normal rate and regular rhythm.      Pulses: Normal pulses.      Heart sounds: Normal heart sounds.   Pulmonary:      Effort: Pulmonary effort is normal.      Breath sounds: Normal breath sounds.   Abdominal:      General: Abdomen is flat.      Palpations: Abdomen is soft.   Musculoskeletal:         General: Normal range of motion.      Cervical back: Normal range " of motion and neck supple.   Skin:     General: Skin is warm and dry.      Capillary Refill: Capillary refill takes less than 2 seconds.      Comments: Darkening, velvety areas on posterior and anterior nape   Neurological:      General: No focal deficit present.      Mental Status: He is alert and oriented to person, place, and time.   Psychiatric:         Mood and Affect: Mood normal.         Behavior: Behavior normal.         Thought Content: Thought content normal.          ASSESSMENT:   Paras Vargas is a 24 y.o. male who presents to clinic for    1. Prediabetes    2. Comedonal acne    3. Encounter for vasectomy         PLAN:     Prediabetes  - New diagnosis with A1c last visit 5.9.   - Down 13 lbs since last visit as patient states he has been going to gym more frequently.   - Continue lifestyle modifications.   - Will hold off on medications for now.     Comedonal acne  -     tretinoin (RETIN-A) 0.025 % cream; Apply topically every evening.  Dispense: 45 g; Refill: 0  - Refills sent to pharmacy at [facility] for better authorization and approval.     Encounter for vasectomy  - Plans for surgery in May, patient medically optimized.       Provided patient with anticipatory guidance and return precautions. Treatment plan discussed with patient, all questions answered, and patient acknowledged understanding and verbal agreement.      Follow-up in: 1 months to assess response to vasectomy.       Case discussed with Dr. AUSTIN Vera Do, MD  [facility] Family Medicine PGY-2

## 2025-04-17 NOTE — PROGRESS NOTES
I assume primary medical responsibility for this patient. I have reviewed the history, physical, and assessement & treatment plan with the resident and agree that the care is reasonable and necessary. This service has been performed by a resident without the presence of a teaching physician under the primary care exception. If necessary, an addendum of additional findings or evaluation beyond the resident documentation will be noted below.      Alicia Che MD    [facility]

## 2025-05-05 ENCOUNTER — PROCEDURE VISIT (OUTPATIENT)
Dept: UROLOGY | Facility: CLINIC | Age: 25
End: 2025-05-05
Payer: MEDICAID

## 2025-05-05 VITALS — BODY MASS INDEX: 38.23 KG/M2 | WEIGHT: 266.44 LBS

## 2025-05-05 DIAGNOSIS — Z30.2 ENCOUNTER FOR VASECTOMY: ICD-10-CM

## 2025-05-05 PROCEDURE — 55250 REMOVAL OF SPERM DUCT(S): CPT | Mod: PBBFAC | Performed by: UROLOGY

## 2025-05-05 PROCEDURE — 88302 TISSUE EXAM BY PATHOLOGIST: CPT | Mod: TC | Performed by: UROLOGY

## 2025-05-05 NOTE — PROCEDURES
"Vasectomy    Date/Time: 5/5/2025 11:00 AM    Performed by: Elizabeth Simms MD  Authorized by: Elizabeth Simms MD    Consent Done?:  Yes (Written)  Time out: Immediately prior to procedure a "time out" was called to verify the correct patient, procedure, equipment, support staff and site/side marked as required.    Indications:  [place] male  Position:  Other (supine)  Anesthesia:  10 cc 2% Lidocaine  Patient sedated: No    Preparation: Patient was prepped and draped in usual sterile fashion    Incisions:  1  Length vas excised:  Other (1cm)  Vas:  Fulgurated and Buried  Sutures:  3-0 chromic SH  Skin closures:  3-0 chromic SH  Same procedure performed on both sides    Patient tolerance:  Patient tolerated the procedure well with no immediate complications       Thin vas bilaterally, L vas difficult to palpate/isolate.        POSTOPERATIVE DIAGNOSIS:  Seeks elective permanent sterilization.    POSTOPERATIVE DIAGNOSIS:  Seeks elective permanent sterilization.    PATHOLOGICAL DIAGNOSIS:  Pending.    ANESTHESIA:  Local.    CLINICAL HISTORY:  Patient seeks elective permanent sterilization by means of a bilateral vasectomy.  He had a complete vasectomy consult prior to this and understands the risks, benefits and alternatives.  Written informed consent was obtained prior to the procedure.    OFFICE PROCEDURE:  The patient was brought to the procedure room, and after identification by name, was placed supine on the procedure room table.  A complete time-out was performed.  The patient was prepped and draped in the usual sterile manner.  The vas were identified bilaterally and 8 mL of 1% lidocaine were injected on each side in order to provide local anesthestic block.  A 15-blade scalpel was then used to incise the skin. The skin was stretched with sharp forceps to allow access to the cord. Ring forceps were then used to bring the cord at the skin level.  It was  away from its underlying vascular " tissue.  A 1 cm section of the vas was then cut from each side and sent to Pathology for further evaluation.  Each of the sides of the vas were then cauterized in their lumen.  The superior portion of the vas was tied over itself and was crimped in a manner so that it was positioned away from the opposite end of the vas.  Each side of the vas were then placed back in their appropriate anatomic position away from each other within the scrotum. The skin was closed with a figure-of-eight 3-0 Chromic suture. The patient tolerated the procedure without difficulty.  There was no bruising or hematoma noted at the end of the case.  The patient was cleaned and dried and given post-vasectomy instructions.    COMPLICATIONS:  None.    ESTIMATED BLOOD LOSS:  Minimal.    FOLLOWUP:  The patient will follow up in 3 months with a semen sample.  He understands that he is considered fertile until semen sample shows that he is sterile. He knows he must continue alternative form of birth control until sterility has been confirmed.      Elizabeth Simms MD

## 2025-05-07 LAB
ESTROGEN SERPL-MCNC: NORMAL PG/ML
INSULIN SERPL-ACNC: NORMAL U[IU]/ML
LAB AP GROSS DESCRIPTION: NORMAL
LAB AP PERFORMING LOCATION(S): NORMAL
LAB AP REPORT FOOTNOTES: NORMAL